# Patient Record
Sex: FEMALE | Race: WHITE | Employment: FULL TIME | URBAN - METROPOLITAN AREA
[De-identification: names, ages, dates, MRNs, and addresses within clinical notes are randomized per-mention and may not be internally consistent; named-entity substitution may affect disease eponyms.]

---

## 2017-02-28 ENCOUNTER — TELEPHONE (OUTPATIENT)
Dept: INTERNAL MEDICINE CLINIC | Age: 32
End: 2017-02-28

## 2017-02-28 ENCOUNTER — HOSPITAL ENCOUNTER (EMERGENCY)
Age: 32
Discharge: HOME | End: 2017-02-28
Attending: EMERGENCY MEDICINE
Payer: COMMERCIAL

## 2017-02-28 VITALS
RESPIRATION RATE: 16 BRPM | WEIGHT: 161 LBS | DIASTOLIC BLOOD PRESSURE: 70 MMHG | BODY MASS INDEX: 25.27 KG/M2 | SYSTOLIC BLOOD PRESSURE: 103 MMHG | HEIGHT: 67 IN | TEMPERATURE: 97.9 F | OXYGEN SATURATION: 100 % | HEART RATE: 52 BPM

## 2017-02-28 DIAGNOSIS — K52.9 GASTROENTERITIS, ACUTE: Primary | ICD-10-CM

## 2017-02-28 DIAGNOSIS — K92.1 BLOODY STOOLS: ICD-10-CM

## 2017-02-28 LAB
ALBUMIN SERPL BCP-MCNC: 3.6 G/DL (ref 3.4–5)
ALBUMIN/GLOB SERPL: 0.9 {RATIO} (ref 0.8–1.7)
ALP SERPL-CCNC: 58 U/L (ref 45–117)
ALT SERPL-CCNC: 26 U/L (ref 13–56)
ANION GAP BLD CALC-SCNC: 6 MMOL/L (ref 3–18)
APPEARANCE UR: CLEAR
AST SERPL W P-5'-P-CCNC: 19 U/L (ref 15–37)
BACTERIA URNS QL MICRO: ABNORMAL /HPF
BASOPHILS # BLD AUTO: 0 K/UL (ref 0–0.06)
BASOPHILS # BLD: 1 % (ref 0–2)
BILIRUB SERPL-MCNC: 0.4 MG/DL (ref 0.2–1)
BILIRUB UR QL: NEGATIVE
BUN SERPL-MCNC: 8 MG/DL (ref 7–18)
BUN/CREAT SERPL: 11 (ref 12–20)
CALCIUM SERPL-MCNC: 8.2 MG/DL (ref 8.5–10.1)
CHLORIDE SERPL-SCNC: 105 MMOL/L (ref 100–108)
CO2 SERPL-SCNC: 28 MMOL/L (ref 21–32)
COLOR UR: YELLOW
CREAT SERPL-MCNC: 0.75 MG/DL (ref 0.6–1.3)
DIFFERENTIAL METHOD BLD: NORMAL
EOSINOPHIL # BLD: 0.1 K/UL (ref 0–0.4)
EOSINOPHIL NFR BLD: 1 % (ref 0–5)
EPITH CASTS URNS QL MICRO: ABNORMAL /LPF (ref 0–5)
ERYTHROCYTE [DISTWIDTH] IN BLOOD BY AUTOMATED COUNT: 13.3 % (ref 11.6–14.5)
GLOBULIN SER CALC-MCNC: 3.8 G/DL (ref 2–4)
GLUCOSE SERPL-MCNC: 90 MG/DL (ref 74–99)
GLUCOSE UR STRIP.AUTO-MCNC: NEGATIVE MG/DL
HCG UR QL: NEGATIVE
HCT VFR BLD AUTO: 39.4 % (ref 35–45)
HGB BLD-MCNC: 13 G/DL (ref 12–16)
HGB UR QL STRIP: ABNORMAL
INR PPP: 1 (ref 0.8–1.2)
KETONES UR QL STRIP.AUTO: NEGATIVE MG/DL
LEUKOCYTE ESTERASE UR QL STRIP.AUTO: NEGATIVE
LYMPHOCYTES # BLD AUTO: 28 % (ref 21–52)
LYMPHOCYTES # BLD: 1.9 K/UL (ref 0.9–3.6)
MCH RBC QN AUTO: 27.5 PG (ref 24–34)
MCHC RBC AUTO-ENTMCNC: 33 G/DL (ref 31–37)
MCV RBC AUTO: 83.3 FL (ref 74–97)
MONOCYTES # BLD: 0.4 K/UL (ref 0.05–1.2)
MONOCYTES NFR BLD AUTO: 6 % (ref 3–10)
MUCOUS THREADS URNS QL MICRO: ABNORMAL /LPF
NEUTS SEG # BLD: 4.5 K/UL (ref 1.8–8)
NEUTS SEG NFR BLD AUTO: 64 % (ref 40–73)
NITRITE UR QL STRIP.AUTO: NEGATIVE
PH UR STRIP: 5.5 [PH] (ref 5–8)
PLATELET # BLD AUTO: 279 K/UL (ref 135–420)
PMV BLD AUTO: 10.2 FL (ref 9.2–11.8)
POTASSIUM SERPL-SCNC: 3.6 MMOL/L (ref 3.5–5.5)
PROT SERPL-MCNC: 7.4 G/DL (ref 6.4–8.2)
PROT UR STRIP-MCNC: NEGATIVE MG/DL
PROTHROMBIN TIME: 12.8 SEC (ref 11.5–15.2)
RBC # BLD AUTO: 4.73 M/UL (ref 4.2–5.3)
RBC #/AREA URNS HPF: ABNORMAL /HPF (ref 0–5)
SODIUM SERPL-SCNC: 139 MMOL/L (ref 136–145)
SP GR UR REFRACTOMETRY: 1.02 (ref 1–1.03)
UROBILINOGEN UR QL STRIP.AUTO: 0.2 EU/DL (ref 0.2–1)
WBC # BLD AUTO: 7 K/UL (ref 4.6–13.2)
WBC URNS QL MICRO: ABNORMAL /HPF (ref 0–4)

## 2017-02-28 PROCEDURE — C9113 INJ PANTOPRAZOLE SODIUM, VIA: HCPCS | Performed by: PHYSICIAN ASSISTANT

## 2017-02-28 PROCEDURE — 96361 HYDRATE IV INFUSION ADD-ON: CPT

## 2017-02-28 PROCEDURE — 80053 COMPREHEN METABOLIC PANEL: CPT

## 2017-02-28 PROCEDURE — 81025 URINE PREGNANCY TEST: CPT

## 2017-02-28 PROCEDURE — 81001 URINALYSIS AUTO W/SCOPE: CPT

## 2017-02-28 PROCEDURE — 74011250636 HC RX REV CODE- 250/636: Performed by: PHYSICIAN ASSISTANT

## 2017-02-28 PROCEDURE — 85610 PROTHROMBIN TIME: CPT

## 2017-02-28 PROCEDURE — 96374 THER/PROPH/DIAG INJ IV PUSH: CPT

## 2017-02-28 PROCEDURE — 99284 EMERGENCY DEPT VISIT MOD MDM: CPT

## 2017-02-28 PROCEDURE — 85025 COMPLETE CBC W/AUTO DIFF WBC: CPT

## 2017-02-28 RX ORDER — PROMETHAZINE HYDROCHLORIDE 25 MG/1
25 TABLET ORAL
Qty: 12 TAB | Refills: 0 | Status: SHIPPED | OUTPATIENT
Start: 2017-02-28 | End: 2019-02-26 | Stop reason: CLARIF

## 2017-02-28 RX ORDER — CIPROFLOXACIN 500 MG/1
500 TABLET ORAL 2 TIMES DAILY
Qty: 14 TAB | Refills: 0 | Status: SHIPPED | OUTPATIENT
Start: 2017-02-28 | End: 2017-03-07

## 2017-02-28 RX ORDER — PANTOPRAZOLE SODIUM 40 MG/10ML
40 INJECTION, POWDER, LYOPHILIZED, FOR SOLUTION INTRAVENOUS
Status: COMPLETED | OUTPATIENT
Start: 2017-02-28 | End: 2017-02-28

## 2017-02-28 RX ORDER — PANTOPRAZOLE SODIUM 40 MG/1
40 TABLET, DELAYED RELEASE ORAL DAILY
Qty: 14 TAB | Refills: 0 | Status: SHIPPED | OUTPATIENT
Start: 2017-02-28 | End: 2017-03-14

## 2017-02-28 RX ADMIN — SODIUM CHLORIDE 1000 ML: 900 INJECTION, SOLUTION INTRAVENOUS at 11:08

## 2017-02-28 RX ADMIN — PANTOPRAZOLE SODIUM 40 MG: 40 INJECTION, POWDER, FOR SOLUTION INTRAVENOUS at 11:26

## 2017-02-28 NOTE — ED PROVIDER NOTES
HPI 36 YOF here for 5 days of feeling sick with GI issues. She says she at [employer] Thursday night and Friday she became sick with abdominal pain, nausea, then diarrhea, then she started Friday with bloody mucus in her stool, then some bloody diarrhea. She says she has mild abdominal pain, crampy with the blood that isn't going away. She denies fevers, chills, vomiting, SOB, chest pain, syncope, dizziness, diaphoresis, pallor, or pelvic pain. She denies any history of abdominal diseases. 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    Noninfectious gastroenteritis and colitis        Past Surgical History:   Procedure Laterality Date    HX GYN          HX OTHER SURGICAL  2009    1.1cm elliptical excision of benign epidermal inclusion cyst    HX OTHER SURGICAL  2009    wide local excision of left shoulder cellular neurothekeoma    HX RHINOPLASTY           Family History:   Problem Relation Age of Onset    Stroke Mother     Alcohol abuse Father     Bleeding Prob Father     Psychiatric Disorder Father     Stroke Father     Alcohol abuse Paternal Aunt     Arthritis-osteo Maternal Grandmother     Diabetes Maternal Grandfather     Heart Disease Maternal Grandfather     Cancer Paternal Grandfather        Social History     Social History    Marital status:      Spouse name: N/A    Number of children: N/A    Years of education: N/A     Occupational History    Not on file.      Social History Main Topics    Smoking status: Former Smoker     Packs/day: 0.50    Smokeless tobacco: Never Used    Alcohol use 6.0 oz/week     12 Standard drinks or equivalent per week    Drug use: No    Sexual activity: Yes     Partners: Male     Other Topics Concern    Not on file     Social History Narrative         ALLERGIES: Review of patient's allergies indicates no known allergies. Review of Systems   Constitutional: Positive for appetite change. Negative for chills, diaphoresis, fatigue and fever. HENT: Negative. Eyes: Negative. Respiratory: Negative. Gastrointestinal: Positive for abdominal pain, anal bleeding, blood in stool, diarrhea and nausea. Negative for abdominal distention, constipation, rectal pain and vomiting. Genitourinary: Negative. Musculoskeletal: Negative. Skin: Negative. Neurological: Negative. Psychiatric/Behavioral: Negative for confusion. All other systems reviewed and are negative. Vitals:    02/28/17 0933   BP: 120/74   Pulse: 78   Resp: 16   Temp: 97.9 °F (36.6 °C)   SpO2: 100%   Weight: 73 kg (161 lb)   Height: 5' 7\" (1.702 m)            Physical Exam   Constitutional: She is oriented to person, place, and time. She appears well-developed and well-nourished. No distress. Non toxic, appears well and healthy. HENT:   Head: Normocephalic and atraumatic. Right Ear: External ear normal.   Left Ear: External ear normal.   Nose: Nose normal.   Mouth/Throat: Oropharynx is clear and moist.   Eyes: Conjunctivae and EOM are normal. Pupils are equal, round, and reactive to light. Neck: Normal range of motion. Neck supple. Cardiovascular: Normal rate, regular rhythm, normal heart sounds and intact distal pulses. Pulmonary/Chest: Effort normal and breath sounds normal.   Abdominal: Soft. Bowel sounds are normal. She exhibits no distension and no mass. There is tenderness (generalized tender ). There is no rebound and no guarding. Musculoskeletal: Normal range of motion. She exhibits no edema or tenderness. Lymphadenopathy:     She has no cervical adenopathy. Neurological: She is alert and oriented to person, place, and time. Skin: Skin is warm and dry. No rash noted. She is not diaphoretic. No erythema. No pallor.    Psychiatric: She has a normal mood and affect. Her behavior is normal.   Nursing note and vitals reviewed. MDM  Number of Diagnoses or Management Options  Bloody stools:   Gastroenteritis, acute:   Diagnosis management comments: Discussed the workup with the patient and differential diagnoses. Amount and/or Complexity of Data Reviewed  Clinical lab tests: ordered and reviewed    Risk of Complications, Morbidity, and/or Mortality  Presenting problems: low  Diagnostic procedures: low  Management options: low  General comments: Labs back, I rechecked patient. She is still doing well, no distress. Etiology was likely caused by the food. No bleeding here in the ED. She is stable now, she was mildly orthostatic, fluids helped, she feels she is ready for home. No need for any radiological imaging at this time. Start Cipro, protonix, phenergan for now, see GI if still symptomatic in a few days. Patient Progress  Patient progress: stable (And improved. )    ED Course       Procedures    Labs Reviewed   METABOLIC PANEL, COMPREHENSIVE - Abnormal; Notable for the following:        Result Value    BUN/Creatinine ratio 11 (*)     Calcium 8.2 (*)     All other components within normal limits   URINALYSIS W/ RFLX MICROSCOPIC - Abnormal; Notable for the following:     Blood TRACE (*)     All other components within normal limits   URINE MICROSCOPIC ONLY - Abnormal; Notable for the following:     Bacteria 1+ (*)     Mucus 2+ (*)     All other components within normal limits   PROTHROMBIN TIME + INR   CBC WITH AUTOMATED DIFF   HCG URINE, QL         ICD-10-CM ICD-9-CM   1. Gastroenteritis, acute K52.9 558.9   2. Bloody stools K92.1 578.1       Plan: Fluids, rest, cipro, phenergan, protonix, see PCP/GI if no improvement in 2 days, return here for any worsening or concerns. She says she doesn't need a work note.

## 2017-02-28 NOTE — ED TRIAGE NOTES
Onset Friday of nausea and abdominal pain then diarrhea started. At this point just mucous and blood in stool.

## 2018-03-22 ENCOUNTER — OFFICE VISIT (OUTPATIENT)
Dept: INTERNAL MEDICINE CLINIC | Age: 33
End: 2018-03-22

## 2018-03-22 VITALS
DIASTOLIC BLOOD PRESSURE: 72 MMHG | RESPIRATION RATE: 16 BRPM | TEMPERATURE: 98.4 F | WEIGHT: 161 LBS | HEART RATE: 70 BPM | HEIGHT: 67 IN | BODY MASS INDEX: 25.27 KG/M2 | SYSTOLIC BLOOD PRESSURE: 110 MMHG | OXYGEN SATURATION: 98 %

## 2018-03-22 DIAGNOSIS — R42 DIZZINESS: Primary | ICD-10-CM

## 2018-03-22 RX ORDER — PROPRANOLOL HYDROCHLORIDE 10 MG/1
TABLET ORAL
Qty: 60 TAB | Refills: 2 | Status: SHIPPED | OUTPATIENT
Start: 2018-03-22 | End: 2019-02-26 | Stop reason: CLARIF

## 2018-03-22 NOTE — PROGRESS NOTES
Identified pt with two pt identifiers(name and ). Reviewed record in preparation for visit and have obtained necessary documentation. Chief Complaint   Patient presents with    Dizziness     reports happened 2 weeks ago,states she is not fasting today    Altered mental status    Nausea        Health Maintenance Due   Topic    DTaP/Tdap/Td series (1 - Tdap)    PAP AKA CERVICAL CYTOLOGY     reviewed w/patient. Depression Screening:  PHQ over the last two weeks 2015   Little interest or pleasure in doing things Not at all   Feeling down, depressed or hopeless Not at all   Total Score PHQ 2 0       Learning Assessment:  Learning Assessment 2015   PRIMARY LEARNER Patient   HIGHEST LEVEL OF EDUCATION - PRIMARY LEARNER  4 YEARS OF COLLEGE   PRIMARY LANGUAGE ENGLISH    NEED No   LEARNER PREFERENCE PRIMARY READING   ANSWERED BY PATIENT   RELATIONSHIP SELF       Abuse Screening:  Abuse Screening Questionnaire 3/22/2018   Do you ever feel afraid of your partner? N   Are you in a relationship with someone who physically or mentally threatens you? N   Is it safe for you to go home? Y       Fall Risk  No flowsheet data found. Coordination of Care Questionnaire:  :   1) Have you been to an emergency room, urgent care clinic since your last visit? no   Hospitalized since your last visit? no             2. Have seen or consulted any other health care provider since your last visit? NO  If yes, where when, and reason for visit? 3) Do you have an Advanced Directive/ Living Will in place? No  If no, would you like information No    Patient is accompanied by self.

## 2018-03-25 NOTE — PROGRESS NOTES
The patient presents to the office today with the chief complaint of dizziness    HPI    The patient has episodes of dizziness - characterized as a sudden feelin go fatigue, nausea, sweating with lightheadedness. The patient has a busy and at times stressful schedule at work. She notes no palpitations. Review of Systems   Respiratory: Negative for shortness of breath. Cardiovascular: Negative for chest pain and leg swelling. Neurological: Positive for dizziness. No Known Allergies    Current Outpatient Prescriptions   Medication Sig Dispense Refill    levonorgestrel (MIRENA) 20 mcg/24 hr (5 years) IUD 1 Device by IntraUTERine route once.  propranolol (INDERAL) 10 mg tablet 1 tab twice per day 60 Tab 2    promethazine (PHENERGAN) 25 mg tablet Take 1 Tab by mouth every six (6) hours as needed. 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      Social History Main Topics    Smoking status: Former Smoker     Packs/day: 0.50    Smokeless tobacco: Never Used    Alcohol use 6.0 oz/week     12 Standard drinks or equivalent per week    Drug use: No    Sexual activity: Yes     Partners: Male     Other Topics Concern    Not on file Social History Narrative       Patient does not have an advanced directive on file    Visit Vitals    /72 (BP 1 Location: Left arm, BP Patient Position: Sitting)    Pulse 70    Temp 98.4 °F (36.9 °C) (Oral)    Resp 16    Ht 5' 7\" (1.702 m)    Wt 161 lb (73 kg)    SpO2 98%    Breastfeeding No    BMI 25.22 kg/m2       Physical Exam   No Cervical Lymphadenopathy  No Supraclavicular Lymphadenopathy  Thyroid is Normal  Lungs are normal to percussion. Clear to auscultation   Heart:  S1 S2 are normal, No gallops, No mummers  No Carotid Bruits  Abdomen:  Normal Bowel Sounds. No tenderness. No masses. No Hepatomegaly or Splenomegly  LE:  Strong Pedal Pulses. No Edema      BMI:  OK    No visits with results within 3 Month(s) from this visit. Latest known visit with results is:    Admission on 02/28/2017, Discharged on 02/28/2017   Component Date Value Ref Range Status    Sodium 02/28/2017 139  136 - 145 mmol/L Final    Potassium 02/28/2017 3.6  3.5 - 5.5 mmol/L Final    Chloride 02/28/2017 105  100 - 108 mmol/L Final    CO2 02/28/2017 28  21 - 32 mmol/L Final    Anion gap 02/28/2017 6  3.0 - 18 mmol/L Final    Glucose 02/28/2017 90  74 - 99 mg/dL Final    BUN 02/28/2017 8  7.0 - 18 MG/DL Final    Creatinine 02/28/2017 0.75  0.6 - 1.3 MG/DL Final    BUN/Creatinine ratio 02/28/2017 11* 12 - 20   Final    GFR est AA 02/28/2017 >60  >60 ml/min/1.73m2 Final    GFR est non-AA 02/28/2017 >60  >60 ml/min/1.73m2 Final    Comment: (NOTE)  Estimated GFR is calculated using the Modification of Diet in Renal   Disease (MDRD) Study equation, reported for both  Americans   (GFRAA) and non- Americans (GFRNA), and normalized to 1.73m2   body surface area. The physician must decide which value applies to   the patient. The MDRD study equation should only be used in   individuals age 25 or older.  It has not been validated for the   following: pregnant women, patients with serious comorbid conditions,   or on certain medications, or persons with extremes of body size,   muscle mass, or nutritional status.  Calcium 02/28/2017 8.2* 8.5 - 10.1 MG/DL Final    Bilirubin, total 02/28/2017 0.4  0.2 - 1.0 MG/DL Final    ALT (SGPT) 02/28/2017 26  13 - 56 U/L Final    AST (SGOT) 02/28/2017 19  15 - 37 U/L Final    Alk. phosphatase 02/28/2017 58  45 - 117 U/L Final    Protein, total 02/28/2017 7.4  6.4 - 8.2 g/dL Final    Albumin 02/28/2017 3.6  3.4 - 5.0 g/dL Final    Globulin 02/28/2017 3.8  2.0 - 4.0 g/dL Final    A-G Ratio 02/28/2017 0.9  0.8 - 1.7   Final    Prothrombin time 02/28/2017 12.8  11.5 - 15.2 sec Final    INR 02/28/2017 1.0  0.8 - 1.2   Final    Comment:            INR Therapeutic Ranges         (on stable oral anticoagulant):     INDICATION                INR  DVT/PE/Atrial Fib          2.0-3.0  MI/Mechanical Heart Valve  2.5-3.5      WBC 02/28/2017 7.0  4.6 - 13.2 K/uL Final    RBC 02/28/2017 4.73  4.20 - 5.30 M/uL Final    HGB 02/28/2017 13.0  12.0 - 16.0 g/dL Final    HCT 02/28/2017 39.4  35.0 - 45.0 % Final    MCV 02/28/2017 83.3  74.0 - 97.0 FL Final    MCH 02/28/2017 27.5  24.0 - 34.0 PG Final    MCHC 02/28/2017 33.0  31.0 - 37.0 g/dL Final    RDW 02/28/2017 13.3  11.6 - 14.5 % Final    PLATELET 44/82/4963 255  135 - 420 K/uL Final    MPV 02/28/2017 10.2  9.2 - 11.8 FL Final    NEUTROPHILS 02/28/2017 64  40 - 73 % Final    LYMPHOCYTES 02/28/2017 28  21 - 52 % Final    MONOCYTES 02/28/2017 6  3 - 10 % Final    EOSINOPHILS 02/28/2017 1  0 - 5 % Final    BASOPHILS 02/28/2017 1  0 - 2 % Final    ABS. NEUTROPHILS 02/28/2017 4.5  1.8 - 8.0 K/UL Final    ABS. LYMPHOCYTES 02/28/2017 1.9  0.9 - 3.6 K/UL Final    ABS. MONOCYTES 02/28/2017 0.4  0.05 - 1.2 K/UL Final    ABS. EOSINOPHILS 02/28/2017 0.1  0.0 - 0.4 K/UL Final    ABS.  BASOPHILS 02/28/2017 0.0  0.0 - 0.06 K/UL Final    DF 02/28/2017 AUTOMATED    Final    Color 02/28/2017 YELLOW    Final    Appearance 02/28/2017 CLEAR    Final    Specific gravity 02/28/2017 1.023  1.005 - 1.030   Final    pH (UA) 02/28/2017 5.5  5.0 - 8.0   Final    Protein 02/28/2017 NEGATIVE   NEG mg/dL Final    Glucose 02/28/2017 NEGATIVE   NEG mg/dL Final    Ketone 02/28/2017 NEGATIVE   NEG mg/dL Final    Bilirubin 02/28/2017 NEGATIVE   NEG   Final    Blood 02/28/2017 TRACE* NEG   Final    Urobilinogen 02/28/2017 0.2  0.2 - 1.0 EU/dL Final    Nitrites 02/28/2017 NEGATIVE   NEG   Final    Leukocyte Esterase 02/28/2017 NEGATIVE   NEG   Final    HCG urine, QL 02/28/2017 NEGATIVE   NEG   Final    Test results should be confirmed using serum quantitative hCG when detection of pregnancy is critical and before performing any critical medical procedure.  WBC 02/28/2017 0 to 3  0 - 4 /hpf Final    RBC 02/28/2017 NONE  0 - 5 /hpf Final    Epithelial cells 02/28/2017 3+  0 - 5 /lpf Final    Bacteria 02/28/2017 1+* NEG /hpf Final    Mucus 02/28/2017 2+* NEG /lpf Final       .No results found for any visits on 03/22/18. Assessment / Plan      ICD-10-CM ICD-9-CM    1. Dizziness R42 780.4 CBC WITH AUTOMATED DIFF      METABOLIC PANEL, COMPREHENSIVE      TSH 3RD GENERATION       Labs  Low dose beta blocker  Further plans will be based on the lab results and the response to therapy    Follow-up Disposition:  Return in about 4 months (around 7/22/2018). I asked Odette Ceja if she has any questions and I answered the questions.   Odette Ceja states that she understands the treatment plan and agrees with the treatment plan